# Patient Record
Sex: MALE | HISPANIC OR LATINO | URBAN - METROPOLITAN AREA
[De-identification: names, ages, dates, MRNs, and addresses within clinical notes are randomized per-mention and may not be internally consistent; named-entity substitution may affect disease eponyms.]

---

## 2018-09-25 ENCOUNTER — OFFICE VISIT (OUTPATIENT)
Dept: URBAN - METROPOLITAN AREA CLINIC 48 | Facility: CLINIC | Age: 73
End: 2018-09-25
Payer: COMMERCIAL

## 2018-09-25 PROCEDURE — 92014 COMPRE OPH EXAM EST PT 1/>: CPT | Performed by: OPHTHALMOLOGY

## 2018-09-25 ASSESSMENT — INTRAOCULAR PRESSURE
OS: 13
OD: 14

## 2018-09-25 ASSESSMENT — KERATOMETRY
OD: 44.38
OS: 44.13

## 2018-09-25 NOTE — IMPRESSION/PLAN
Impression: Age-related nuclear cataract, right eye: H25.11. Plan: The patient has a visually significant cataract in the right eye. After discussion with the patient and careful examination it has been determined that a cataract in the right eye is accounting for a significant amount of the patient's visual symptoms. Cataract surgery and the associated risks, benefits, alternatives, expectations, and recovery were discussed in detail with the patient. All questions were answered. The patient understands that there may be some limitation in visual potential given any pre-existing ocular disease. The patient desires cataract surgery in the right eye. Schedule cataract surgery in the right eye.   RL3

## 2019-03-22 ENCOUNTER — OFFICE VISIT (OUTPATIENT)
Dept: URBAN - METROPOLITAN AREA CLINIC 48 | Facility: CLINIC | Age: 74
End: 2019-03-22
Payer: COMMERCIAL

## 2019-03-22 DIAGNOSIS — H25.11 AGE-RELATED NUCLEAR CATARACT, RIGHT EYE: Primary | ICD-10-CM

## 2019-03-22 PROCEDURE — 92014 COMPRE OPH EXAM EST PT 1/>: CPT | Performed by: OPHTHALMOLOGY

## 2019-03-22 ASSESSMENT — VISUAL ACUITY
OS: 20/40
OD: 20/60

## 2019-03-22 ASSESSMENT — INTRAOCULAR PRESSURE
OS: 15
OD: 13

## 2019-04-17 ENCOUNTER — TESTING ONLY (OUTPATIENT)
Dept: URBAN - METROPOLITAN AREA CLINIC 48 | Facility: CLINIC | Age: 74
End: 2019-04-17
Payer: COMMERCIAL

## 2019-04-17 PROCEDURE — 92136 OPHTHALMIC BIOMETRY: CPT | Performed by: OPHTHALMOLOGY

## 2019-04-17 ASSESSMENT — PACHYMETRY
OD: 22.90
OS: 23.06
OS: 4.90
OD: 2.83

## 2019-04-25 ENCOUNTER — SURGERY (OUTPATIENT)
Dept: URBAN - METROPOLITAN AREA SURGERY 26 | Facility: SURGERY | Age: 74
End: 2019-04-25
Payer: COMMERCIAL

## 2019-04-25 PROCEDURE — 66984 XCAPSL CTRC RMVL W/O ECP: CPT | Performed by: OPHTHALMOLOGY

## 2019-04-26 ENCOUNTER — POST-OPERATIVE VISIT (OUTPATIENT)
Dept: URBAN - METROPOLITAN AREA CLINIC 48 | Facility: CLINIC | Age: 74
End: 2019-04-26
Payer: COMMERCIAL

## 2019-04-26 PROCEDURE — 99024 POSTOP FOLLOW-UP VISIT: CPT | Performed by: OPTOMETRIST

## 2019-04-26 ASSESSMENT — INTRAOCULAR PRESSURE: OD: 30

## 2019-05-03 ENCOUNTER — POST-OPERATIVE VISIT (OUTPATIENT)
Dept: URBAN - METROPOLITAN AREA CLINIC 48 | Facility: CLINIC | Age: 74
End: 2019-05-03
Payer: COMMERCIAL

## 2019-05-03 DIAGNOSIS — Z09 ENCNTR FOR F/U EXAM AFT TRTMT FOR COND OTH THAN MALIG NEOPLM: Primary | ICD-10-CM

## 2019-05-03 PROCEDURE — 99024 POSTOP FOLLOW-UP VISIT: CPT | Performed by: OPTOMETRIST

## 2019-05-03 ASSESSMENT — INTRAOCULAR PRESSURE: OD: 6

## 2019-05-31 ENCOUNTER — POST-OPERATIVE VISIT (OUTPATIENT)
Dept: URBAN - METROPOLITAN AREA CLINIC 48 | Facility: CLINIC | Age: 74
End: 2019-05-31
Payer: COMMERCIAL

## 2019-05-31 PROCEDURE — 99024 POSTOP FOLLOW-UP VISIT: CPT | Performed by: OPTOMETRIST

## 2019-05-31 ASSESSMENT — INTRAOCULAR PRESSURE: OD: 10

## 2019-11-21 ENCOUNTER — OFFICE VISIT (OUTPATIENT)
Dept: URBAN - METROPOLITAN AREA CLINIC 48 | Facility: CLINIC | Age: 74
End: 2019-11-21
Payer: MEDICARE

## 2019-11-21 DIAGNOSIS — H26.493 OTHER SECONDARY CATARACT, BILATERAL: Primary | ICD-10-CM

## 2019-11-21 PROCEDURE — 99214 OFFICE O/P EST MOD 30 MIN: CPT | Performed by: OPHTHALMOLOGY

## 2019-11-21 ASSESSMENT — VISUAL ACUITY: OS: 20/40

## 2019-11-21 ASSESSMENT — KERATOMETRY
OS: 44.13
OD: 44.75

## 2019-11-21 ASSESSMENT — INTRAOCULAR PRESSURE
OS: 16
OD: 14

## 2019-11-21 NOTE — IMPRESSION/PLAN
Impression: Other secondary cataract, bilateral: H26.493. Plan: Risks, benefits, alternatives, and expectations of YAG capsulotomy were discussed. The patient desires a YAG capsulotomy in both eyes. Schedule YAG in both eyes, same day  with 1 day post op.

## 2019-12-18 ENCOUNTER — SURGERY (OUTPATIENT)
Dept: URBAN - METROPOLITAN AREA SURGERY 26 | Facility: SURGERY | Age: 74
End: 2019-12-18
Payer: MEDICARE

## 2019-12-19 ENCOUNTER — POST-OPERATIVE VISIT (OUTPATIENT)
Dept: URBAN - METROPOLITAN AREA CLINIC 48 | Facility: CLINIC | Age: 74
End: 2019-12-19
Payer: MEDICARE

## 2019-12-19 PROCEDURE — 99024 POSTOP FOLLOW-UP VISIT: CPT | Performed by: OPTOMETRIST

## 2019-12-19 ASSESSMENT — INTRAOCULAR PRESSURE
OD: 12
OS: 19

## 2020-07-08 ENCOUNTER — OFFICE VISIT (OUTPATIENT)
Dept: URBAN - METROPOLITAN AREA CLINIC 48 | Facility: CLINIC | Age: 75
End: 2020-07-08
Payer: MEDICARE

## 2020-07-08 DIAGNOSIS — H53.19 OTHER SUBJECTIVE VISUAL DISTURBANCE: Primary | ICD-10-CM

## 2020-07-08 PROCEDURE — 92014 COMPRE OPH EXAM EST PT 1/>: CPT | Performed by: OPTOMETRIST

## 2020-07-08 ASSESSMENT — KERATOMETRY
OD: 44.75
OS: 44.38

## 2020-07-08 ASSESSMENT — INTRAOCULAR PRESSURE
OD: 11
OS: 13

## 2020-07-08 NOTE — IMPRESSION/PLAN
Impression: Other subjective visual disturbance: H53.19. possible PVD Plan: Discussed with patient. RD precautions given.  

RTC 2 months DFE

## 2020-09-09 ENCOUNTER — OFFICE VISIT (OUTPATIENT)
Dept: URBAN - METROPOLITAN AREA CLINIC 48 | Facility: CLINIC | Age: 75
End: 2020-09-09
Payer: MEDICARE

## 2020-09-09 PROCEDURE — 92012 INTRM OPH EXAM EST PATIENT: CPT | Performed by: OPTOMETRIST

## 2020-09-09 ASSESSMENT — INTRAOCULAR PRESSURE
OS: 20
OD: 15

## 2020-09-09 NOTE — IMPRESSION/PLAN
Impression: Other subjective visual disturbance: H53.19. Plan: Discussed diagnosis in detail with patient. Reassured patient of current condition and treatment. Discussed signs and symptoms of PVD/floaters. Discussed signs and symptoms of retinal detachment. Call if [address] worsens.

## 2023-01-18 ENCOUNTER — OFFICE VISIT (OUTPATIENT)
Dept: URBAN - METROPOLITAN AREA CLINIC 48 | Facility: CLINIC | Age: 78
End: 2023-01-18
Payer: MEDICARE

## 2023-01-18 DIAGNOSIS — H43.819 VITREOUS DETACHMENT OF EYE: Primary | ICD-10-CM

## 2023-01-18 DIAGNOSIS — H04.123 DRY EYE SYNDROME OF BILATERAL LACRIMAL GLANDS: ICD-10-CM

## 2023-01-18 PROCEDURE — 99204 OFFICE O/P NEW MOD 45 MIN: CPT | Performed by: STUDENT IN AN ORGANIZED HEALTH CARE EDUCATION/TRAINING PROGRAM

## 2023-01-18 ASSESSMENT — INTRAOCULAR PRESSURE
OD: 12
OS: 18

## 2023-01-18 NOTE — IMPRESSION/PLAN
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 There is no evidence of retinal pathology. All signs and risks of retinal detachment and tears were discussed in detail. Patient instructed to call the office immediately if any symptoms noted.

## 2023-01-18 NOTE — IMPRESSION/PLAN
Impression: Dry eye syndrome of bilateral lacrimal glands: H04.123. Plan: Dry eyes likely cause for the patient's complaints. Explained the nature of the condition and need for lubrication with artificial tears. OU Start OTC AFT QD-QID 
- List of common brand aft names provided for patient today. Patient may return to clinic should condition not improve or worsen.  

RTC PRN

## 2024-06-11 ENCOUNTER — OFFICE VISIT (OUTPATIENT)
Dept: URBAN - METROPOLITAN AREA CLINIC 48 | Facility: CLINIC | Age: 79
End: 2024-06-11
Payer: MEDICARE

## 2024-06-11 DIAGNOSIS — H04.123 DRY EYE SYNDROME OF BILATERAL LACRIMAL GLANDS: Primary | ICD-10-CM

## 2024-06-11 PROCEDURE — 99203 OFFICE O/P NEW LOW 30 MIN: CPT | Performed by: OPTOMETRIST

## 2024-06-11 ASSESSMENT — INTRAOCULAR PRESSURE
OD: 12
OS: 12

## 2024-07-16 ENCOUNTER — OFFICE VISIT (OUTPATIENT)
Dept: URBAN - METROPOLITAN AREA CLINIC 48 | Facility: CLINIC | Age: 79
End: 2024-07-16
Payer: MEDICARE

## 2024-07-16 DIAGNOSIS — H04.123 DRY EYE SYNDROME OF BILATERAL LACRIMAL GLANDS: Primary | ICD-10-CM

## 2024-07-16 PROCEDURE — 99213 OFFICE O/P EST LOW 20 MIN: CPT | Performed by: OPTOMETRIST

## 2025-01-16 ENCOUNTER — OFFICE VISIT (OUTPATIENT)
Dept: URBAN - METROPOLITAN AREA CLINIC 48 | Facility: CLINIC | Age: 80
End: 2025-01-16
Payer: MEDICARE

## 2025-01-16 DIAGNOSIS — Z96.1 PRESENCE OF INTRAOCULAR LENS: Primary | ICD-10-CM

## 2025-01-16 DIAGNOSIS — H04.123 DRY EYE SYNDROME OF BILATERAL LACRIMAL GLANDS: ICD-10-CM

## 2025-01-16 DIAGNOSIS — H43.813 VITREOUS DEGENERATION, BILATERAL: ICD-10-CM

## 2025-01-16 PROCEDURE — 99214 OFFICE O/P EST MOD 30 MIN: CPT | Performed by: OPTOMETRIST

## 2025-01-16 ASSESSMENT — INTRAOCULAR PRESSURE
OS: 15
OD: 14